# Patient Record
Sex: FEMALE
[De-identification: names, ages, dates, MRNs, and addresses within clinical notes are randomized per-mention and may not be internally consistent; named-entity substitution may affect disease eponyms.]

---

## 2020-02-07 ENCOUNTER — TRANSCRIPTION ENCOUNTER (OUTPATIENT)
Age: 65
End: 2020-02-07

## 2020-05-05 ENCOUNTER — TRANSCRIPTION ENCOUNTER (OUTPATIENT)
Age: 65
End: 2020-05-05

## 2020-07-28 PROBLEM — Z00.00 ENCOUNTER FOR PREVENTIVE HEALTH EXAMINATION: Status: ACTIVE | Noted: 2020-07-28

## 2020-07-29 ENCOUNTER — TRANSCRIPTION ENCOUNTER (OUTPATIENT)
Age: 65
End: 2020-07-29

## 2020-07-29 ENCOUNTER — APPOINTMENT (OUTPATIENT)
Dept: WOUND CARE | Facility: CLINIC | Age: 65
End: 2020-07-29
Payer: COMMERCIAL

## 2020-07-29 PROCEDURE — 99204 OFFICE O/P NEW MOD 45 MIN: CPT

## 2020-07-29 NOTE — ASSESSMENT
[FreeTextEntry1] : Wound Assessment and Plan:\par \par The patient presents with a wound to the left wrist\par No clinical sign of infection\par Recommendation:\par \par Apply lidocaine or topical anesthetic if needed to reduce pain upon washing the wound.\par Wash wound with ----0.9% saline/ Dakins 0.125% or Dove skin sensitive soap and clean water \par Apply --- medical grade honey\par Change dressing ---daily/\par \par Optimization of nutrition.\par Offloading to the wound site.\par \par -----Wound supplies ordered via DME\par Patient given contact information to DME\par \par Follow up appointment scheduled for 1 week\par \par TeleHealth Services discussed with the patient and/or family.  Discharge instructions given including download of Jennifer information regarding:\par 1)  [employer] Jennifer to obtain medical records\par 2)  [employer] Jennifer to conduct Face-to-Face TeleHealth visit\par 3)  [employer] for the Patient (patient takes a picture of their wound which is sent to the patient's chart for review)\par
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
## 2020-07-29 NOTE — PHYSICAL EXAM
[Normal Breath Sounds] : Normal breath sounds [Skin Ulcer] : ulcer [Oriented to Person] : oriented to person [Alert] : alert [Oriented to Place] : oriented to place [Oriented to Time] : oriented to time [Calm] : calm [Please See PDF for Tissue Analytics] : Please See PDF for [employer] Analytics. [JVD] : no jugular venous distention  [Abdomen Tenderness] : ~T ~M No abdominal tenderness [de-identified] : NAD [de-identified] : supple [de-identified] : AT [de-identified] : soft

## 2020-08-05 ENCOUNTER — APPOINTMENT (OUTPATIENT)
Dept: WOUND CARE | Facility: CLINIC | Age: 65
End: 2020-08-05
Payer: COMMERCIAL

## 2020-08-05 DIAGNOSIS — Z80.9 FAMILY HISTORY OF MALIGNANT NEOPLASM, UNSPECIFIED: ICD-10-CM

## 2020-08-05 DIAGNOSIS — L71.9 ROSACEA, UNSPECIFIED: ICD-10-CM

## 2020-08-05 DIAGNOSIS — T23.272A BURN OF SECOND DEGREE OF LEFT WRIST, INITIAL ENCOUNTER: ICD-10-CM

## 2020-08-05 PROCEDURE — 99213 OFFICE O/P EST LOW 20 MIN: CPT | Mod: 95

## 2020-08-06 ENCOUNTER — NON-APPOINTMENT (OUTPATIENT)
Age: 65
End: 2020-08-06

## 2020-08-06 PROBLEM — L71.9 ROSACEA: Status: ACTIVE | Noted: 2020-08-06

## 2020-08-06 PROBLEM — T23.272A: Status: ACTIVE | Noted: 2020-07-29

## 2020-08-06 PROBLEM — Z80.9 FAMILY HISTORY OF MALIGNANT NEOPLASM: Status: ACTIVE | Noted: 2020-08-06

## 2020-08-06 NOTE — ASSESSMENT
[FreeTextEntry1] : Wound Assessment and Plan:\par \par The patient presents with a wound to the left wrist\par No clinical sign of infection\par Recommendation:\par \par Apply lidocaine or topical anesthetic if needed to reduce pain upon washing the wound.\par Wash wound with ----0.9% saline/ Dakins 0.125% or Dove skin sensitive soap and clean water \par Apply --- medical grade honey\par Change dressing ---daily/\par \par Optimization of nutrition.\par Offloading to the wound site.\par \par -----Wound supplies ordered via DME\par Patient given contact information to DME\par \par Follow up appointment scheduled for 2 weeks\par \par TeleHealth Services discussed with the patient and/or family.  Discharge instructions given including download of Jennifer information regarding:\par 1)  [employer] Jennifer to obtain medical records\par 2)  [employer] [facility] Jennifer to conduct Face-to-Face TeleHealth visit\par 3)  [employer] for the Patient (patient takes a picture of their wound which is sent to the patient's chart for review)\par
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
## 2020-08-06 NOTE — PHYSICAL EXAM
[Normal Breath Sounds] : Normal breath sounds [Skin Ulcer] : ulcer [Alert] : alert [Oriented to Place] : oriented to place [Oriented to Person] : oriented to person [Oriented to Time] : oriented to time [Calm] : calm [Please See PDF for Tissue Analytics] : Please See PDF for [employer]. [JVD] : no jugular venous distention  [Abdomen Tenderness] : ~T ~M No abdominal tenderness [de-identified] : NAD [de-identified] : AT [de-identified] : supple [de-identified] : soft

## 2020-08-16 ENCOUNTER — NON-APPOINTMENT (OUTPATIENT)
Age: 65
End: 2020-08-16

## 2020-08-24 ENCOUNTER — APPOINTMENT (OUTPATIENT)
Dept: WOUND CARE | Facility: CLINIC | Age: 65
End: 2020-08-24
Payer: COMMERCIAL

## 2020-08-24 DIAGNOSIS — T14.90XA INJURY, UNSPECIFIED, INITIAL ENCOUNTER: ICD-10-CM

## 2020-08-24 PROCEDURE — 99213 OFFICE O/P EST LOW 20 MIN: CPT | Mod: 95

## 2020-08-24 NOTE — REVIEW OF SYSTEMS
[Skin Lesions] : skin lesion [Skin Wound] : skin wound [As Noted in HPI] : as noted in HPI [Negative] : Endocrine

## 2020-08-26 PROBLEM — T14.90XA CLOSED WOUND: Status: ACTIVE | Noted: 2020-08-26

## 2020-08-26 NOTE — PHYSICAL EXAM
[Normal Breath Sounds] : Normal breath sounds [Skin Ulcer] : ulcer [Alert] : alert [Oriented to Time] : oriented to time [Oriented to Person] : oriented to person [Oriented to Place] : oriented to place [Calm] : calm [Please See PDF for Tissue Analytics] : Please See PDF for [employer]. [de-identified] : NAD [de-identified] : AT [JVD] : no jugular venous distention  [Abdomen Tenderness] : ~T ~M No abdominal tenderness [de-identified] : supple [de-identified] : soft

## 2020-08-26 NOTE — CONSULT LETTER
[Consult Letter:] : I had the pleasure of evaluating your patient, [unfilled]. [Consult Closing:] : Thank you very much for allowing me to participate in the care of this patient.  If you have any questions, please do not hesitate to contact me. [Sincerely,] : Sincerely, [Please see my note below.] : Please see my note below.
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
## 2020-08-26 NOTE — ASSESSMENT
[FreeTextEntry1] : Wound Assessment and Plan:\par \par The patient presents with a wound to the left wrist now healed\par No clinical sign of infection\par f/u prn\par \par TeleHealth Services discussed with the patient and/or family.  Discharge instructions given including download of Jennifer information regarding:\par 1)  [email or web address] Jennifer to obtain medical records\par 2)  [employer] [facility] Jennifer to conduct Face-to-Face TeleHealth visit\par 3)  [employer] for the Patient (patient takes a picture of their wound which is sent to the patient's chart for review)\par \par 8/24/20\par Patient's wound appears healed

## 2021-03-01 ENCOUNTER — TRANSCRIPTION ENCOUNTER (OUTPATIENT)
Age: 66
End: 2021-03-01

## 2021-03-03 ENCOUNTER — APPOINTMENT (OUTPATIENT)
Dept: UROLOGY | Facility: CLINIC | Age: 66
End: 2021-03-03
Payer: COMMERCIAL

## 2021-03-03 VITALS
RESPIRATION RATE: 14 BRPM | HEIGHT: 62 IN | HEART RATE: 109 BPM | OXYGEN SATURATION: 91 % | TEMPERATURE: 97.6 F | DIASTOLIC BLOOD PRESSURE: 102 MMHG | WEIGHT: 170 LBS | SYSTOLIC BLOOD PRESSURE: 157 MMHG | BODY MASS INDEX: 31.28 KG/M2

## 2021-03-03 DIAGNOSIS — Z80.52 FAMILY HISTORY OF MALIGNANT NEOPLASM OF BLADDER: ICD-10-CM

## 2021-03-03 DIAGNOSIS — Z87.39 PERSONAL HISTORY OF OTHER DISEASES OF THE MUSCULOSKELETAL SYSTEM AND CONNECTIVE TISSUE: ICD-10-CM

## 2021-03-03 DIAGNOSIS — R35.0 FREQUENCY OF MICTURITION: ICD-10-CM

## 2021-03-03 DIAGNOSIS — R39.15 URGENCY OF URINATION: ICD-10-CM

## 2021-03-03 DIAGNOSIS — F17.200 NICOTINE DEPENDENCE, UNSPECIFIED, UNCOMPLICATED: ICD-10-CM

## 2021-03-03 PROCEDURE — 51798 US URINE CAPACITY MEASURE: CPT

## 2021-03-03 PROCEDURE — 99203 OFFICE O/P NEW LOW 30 MIN: CPT | Mod: 25

## 2021-03-03 PROCEDURE — 99072 ADDL SUPL MATRL&STAF TM PHE: CPT

## 2021-03-03 RX ORDER — SULFAMETHOXAZOLE AND TRIMETHOPRIM 800; 160 MG/1; MG/1
800-160 TABLET ORAL
Qty: 10 | Refills: 0 | Status: ACTIVE | COMMUNITY
Start: 2021-03-03 | End: 1900-01-01

## 2021-03-03 NOTE — PHYSICAL EXAM
[General Appearance - Well Developed] : well developed [General Appearance - Well Nourished] : well nourished [Normal Appearance] : normal appearance [Well Groomed] : well groomed [General Appearance - In No Acute Distress] : no acute distress [Edema] : no peripheral edema [Respiration, Rhythm And Depth] : normal respiratory rhythm and effort [Exaggerated Use Of Accessory Muscles For Inspiration] : no accessory muscle use [Abdomen Soft] : soft [Abdomen Tenderness] : non-tender [Abdomen Mass (___ Cm)] : no abdominal mass palpated [Abdomen Hernia] : no hernia was discovered [Costovertebral Angle Tenderness] : no ~M costovertebral angle tenderness [Normal Station and Gait] : the gait and station were normal for the patient's age [] : no rash [No Focal Deficits] : no focal deficits [Oriented To Time, Place, And Person] : oriented to person, place, and time [Affect] : the affect was normal [Mood] : the mood was normal [Not Anxious] : not anxious [Cervical Lymph Nodes Enlarged Posterior Bilaterally] : posterior cervical [Cervical Lymph Nodes Enlarged Anterior Bilaterally] : anterior cervical [Supraclavicular Lymph Nodes Enlarged Bilaterally] : supraclavicular [Urethral Meatus] : normal urethra [External Female Genitalia] : normal external genitalia [Vagina] : normal vaginal exam [FreeTextEntry1] : soft urethra, urethjra and bladder not tender, no vag  masses or disch ,pelvic muscles not tender and no stool felt vag n rectal vault

## 2021-03-03 NOTE — REVIEW OF SYSTEMS
[Genital bacterial infection] : genital bacterial infection [Date of last menstrual period ____] : date of last menstrual period: [unfilled] [Presently in menopause ___] : presently in menopause [unfilled] [Urine Infection (bladder/kidney)] : bladder/kidney infection [Pain during urination] : pain during urination [Blood in urine that you can see] : blood visible in urine [Discharge from urine canal] : discharge from urine canal [Wake up at night to urinate  How many times?  ___] : wakes up to urinate [unfilled] times during the night [Wait a long time to urinate] : waits a long time to urinate [Slow urine stream] : slow urine stream [Interrupted urine stream] : interrupted urine stream [Bladder fullness after urinating] : bladder fullness after urinating [Joint Pain] : joint pain [Joint Swelling] : joint swelling [Limb Weakness] : limb weakness [Negative] : Heme/Lymph [Recent Weight Gain (___ Lbs)] : recent [unfilled] ~Ulb weight gain [see HPI] : see HPI [FreeTextEntry6] : frequency
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
## 2021-03-03 NOTE — ASSESSMENT
[FreeTextEntry1] : patient witnh hematuria\par resolvedw\par still with frequency and urgecvny  ? uti or other patholgy\par will send urine for eval \par bactrim ds x 5 dys pending cx results\par CT and then cysto \par

## 2021-03-05 LAB
APPEARANCE: ABNORMAL
BACTERIA: ABNORMAL
BILIRUBIN URINE: NEGATIVE
BLOOD URINE: ABNORMAL
COLOR: NORMAL
GLUCOSE QUALITATIVE U: NEGATIVE
HYALINE CASTS: 0 /LPF
KETONES URINE: NEGATIVE
LEUKOCYTE ESTERASE URINE: ABNORMAL
MICROSCOPIC-UA: NORMAL
NITRITE URINE: NEGATIVE
PH URINE: 6.5
PROTEIN URINE: ABNORMAL
RED BLOOD CELLS URINE: 42 /HPF
SPECIFIC GRAVITY URINE: 1.01
SQUAMOUS EPITHELIAL CELLS: 0 /HPF
URINE CYTOLOGY: NORMAL
UROBILINOGEN URINE: NORMAL
WHITE BLOOD CELLS URINE: 137 /HPF

## 2021-03-08 LAB — BACTERIA UR CULT: ABNORMAL

## 2021-03-16 ENCOUNTER — APPOINTMENT (OUTPATIENT)
Dept: CT IMAGING | Facility: CLINIC | Age: 66
End: 2021-03-16
Payer: COMMERCIAL

## 2021-03-16 ENCOUNTER — OUTPATIENT (OUTPATIENT)
Dept: OUTPATIENT SERVICES | Facility: HOSPITAL | Age: 66
LOS: 1 days | End: 2021-03-16
Payer: COMMERCIAL

## 2021-03-16 DIAGNOSIS — Z00.8 ENCOUNTER FOR OTHER GENERAL EXAMINATION: ICD-10-CM

## 2021-03-16 PROCEDURE — 74178 CT ABD&PLV WO CNTR FLWD CNTR: CPT

## 2021-03-16 PROCEDURE — 82565 ASSAY OF CREATININE: CPT

## 2021-03-16 PROCEDURE — 74178 CT ABD&PLV WO CNTR FLWD CNTR: CPT | Mod: 26

## 2021-03-22 ENCOUNTER — APPOINTMENT (OUTPATIENT)
Dept: UROLOGY | Facility: CLINIC | Age: 66
End: 2021-03-22
Payer: COMMERCIAL

## 2021-03-22 VITALS — TEMPERATURE: 98.2 F | SYSTOLIC BLOOD PRESSURE: 140 MMHG | DIASTOLIC BLOOD PRESSURE: 84 MMHG

## 2021-03-22 DIAGNOSIS — N32.89 OTHER SPECIFIED DISORDERS OF BLADDER: ICD-10-CM

## 2021-03-22 DIAGNOSIS — C67.9 MALIGNANT NEOPLASM OF BLADDER, UNSPECIFIED: ICD-10-CM

## 2021-03-22 DIAGNOSIS — Z87.448 PERSONAL HISTORY OF OTHER DISEASES OF URINARY SYSTEM: ICD-10-CM

## 2021-03-22 PROCEDURE — 99072 ADDL SUPL MATRL&STAF TM PHE: CPT

## 2021-03-22 PROCEDURE — 52000 CYSTOURETHROSCOPY: CPT

## 2021-03-24 ENCOUNTER — NON-APPOINTMENT (OUTPATIENT)
Age: 66
End: 2021-03-24

## 2022-04-13 ENCOUNTER — APPOINTMENT (OUTPATIENT)
Dept: ORTHOPEDIC SURGERY | Facility: CLINIC | Age: 67
End: 2022-04-13
Payer: MEDICARE

## 2022-04-13 VITALS — HEIGHT: 62 IN | BODY MASS INDEX: 23.92 KG/M2 | WEIGHT: 130 LBS

## 2022-04-13 DIAGNOSIS — Z78.9 OTHER SPECIFIED HEALTH STATUS: ICD-10-CM

## 2022-04-13 DIAGNOSIS — C67.9 MALIGNANT NEOPLASM OF BLADDER, UNSPECIFIED: ICD-10-CM

## 2022-04-13 DIAGNOSIS — S46.011D STRAIN OF MUSCLE(S) AND TENDON(S) OF THE ROTATOR CUFF OF RIGHT SHOULDER, SUBSEQUENT ENCOUNTER: ICD-10-CM

## 2022-04-13 DIAGNOSIS — M65.20 CALCIFIC TENDINITIS, UNSPECIFIED SITE: ICD-10-CM

## 2022-04-13 DIAGNOSIS — C80.1 MALIGNANT (PRIMARY) NEOPLASM, UNSPECIFIED: ICD-10-CM

## 2022-04-13 PROBLEM — Z00.00 ENCOUNTER FOR PREVENTIVE HEALTH EXAMINATION: Status: ACTIVE | Noted: 2022-04-13

## 2022-04-13 PROCEDURE — 99215 OFFICE O/P EST HI 40 MIN: CPT

## 2022-08-12 ENCOUNTER — APPOINTMENT (OUTPATIENT)
Dept: ORTHOPEDIC SURGERY | Facility: CLINIC | Age: 67
End: 2022-08-12

## 2022-08-12 VITALS — WEIGHT: 132 LBS | HEIGHT: 62 IN | BODY MASS INDEX: 24.29 KG/M2

## 2022-08-12 DIAGNOSIS — Z87.09 PERSONAL HISTORY OF OTHER DISEASES OF THE RESPIRATORY SYSTEM: ICD-10-CM

## 2022-08-12 DIAGNOSIS — Z85.51 PERSONAL HISTORY OF MALIGNANT NEOPLASM OF BLADDER: ICD-10-CM

## 2022-08-12 DIAGNOSIS — Z87.898 PERSONAL HISTORY OF OTHER SPECIFIED CONDITIONS: ICD-10-CM

## 2022-08-12 PROCEDURE — 99203 OFFICE O/P NEW LOW 30 MIN: CPT

## 2022-08-12 NOTE — HISTORY OF PRESENT ILLNESS
[Right Leg] : right leg [Gradual] : gradual [6] : 6 [9] : 9 [Burning] : burning [Dull/Aching] : dull/aching [Localized] : localized [Radiating] : radiating [Constant] : constant [Household chores] : household chores [Leisure] : leisure [Work] : work [Social interactions] : social interactions [Nothing helps with pain getting better] : Nothing helps with pain getting better [Sitting] : sitting [Standing] : standing [Walking] : walking [Exercising] : exercising [Stairs] : stairs [Lying in bed] : lying in bed [Physical therapy] : physical therapy [Retired] : Work status: retired [de-identified] : 08/12/2022 Ms. YEISON GORDON,  67 year old  female , presents today for right lower leg. Reports that she has been experiencing pain, stiffness over the anterior right lower leg. Doppler RLE which was negative for DVT. Outside x-ray 08.07.22 report of the tib/fib without acute fracture or osseous lesion.  \par \par Hx bladder cancer.  [] : no [FreeTextEntry7] : leg  [de-identified] : doppler, xray not available

## 2022-08-12 NOTE — REASON FOR VISIT
[FreeTextEntry2] : 08/12/2022 :YEISON GORDON , a 67 year year old female, presents today for right lower leg pain, no trauma, onset 8/20/22\par \par

## 2022-08-12 NOTE — DISCUSSION/SUMMARY
[de-identified] : 67f with right lower leg pain. Outside x-ray negative for fracture or osseous lesion. Hx of bladder cancer\par 1) mri right lower leg r/p metastatic disease. \par 2) rtc after MRI\par \par Entered by Shannan Buchanan acting as scribe.\par

## 2022-08-12 NOTE — PHYSICAL EXAM
[Right] : right tibia/fibula [Outside films reviewed] : Outside films reviewed [There are no fractures, subluxations or dislocations. No significant abnormalities are seen] : There are no fractures, subluxations or dislocations. No significant abnormalities are seen [] : no calf tenderness

## 2022-08-13 ENCOUNTER — APPOINTMENT (OUTPATIENT)
Dept: MRI IMAGING | Facility: CLINIC | Age: 67
End: 2022-08-13

## 2022-08-18 ENCOUNTER — FORM ENCOUNTER (OUTPATIENT)
Age: 67
End: 2022-08-18

## 2022-08-19 ENCOUNTER — APPOINTMENT (OUTPATIENT)
Dept: MRI IMAGING | Facility: CLINIC | Age: 67
End: 2022-08-19

## 2022-08-19 PROCEDURE — 73718 MRI LOWER EXTREMITY W/O DYE: CPT | Mod: RT,MH

## 2022-08-23 ENCOUNTER — APPOINTMENT (OUTPATIENT)
Dept: ORTHOPEDIC SURGERY | Facility: CLINIC | Age: 67
End: 2022-08-23

## 2022-08-23 VITALS — BODY MASS INDEX: 24.29 KG/M2 | WEIGHT: 132 LBS | HEIGHT: 62 IN

## 2022-08-23 PROCEDURE — 99214 OFFICE O/P EST MOD 30 MIN: CPT

## 2022-08-23 NOTE — DISCUSSION/SUMMARY
[de-identified] : 67f with hx of bladder cx and MRI of the right tib/fib with bone lesion. I spoke with pt's oncologist Dr. Fritz Cornejo to make him aware, pt will be following up with him in the near future. \par \par Entered by Shannan Buchanan acting as scribe.\par

## 2022-08-23 NOTE — DATA REVIEWED
[MRI] : MRI [Right] : of the right [Report was reviewed and noted in the chart] : The report was reviewed and noted in the chart [I independently reviewed and interpreted images and report] : I independently reviewed and interpreted images and report [I reviewed the films/CD] : I reviewed the films/CD [FreeTextEntry1] : Irregular appearing bone lesion suspicion for neoplasm with probable metastatic foci or potentially myeloma in the proximal tibial shaft with a 5 cm bone lesion surrounded by marrow edema and soft tissue swelling with soft tissue strains and muscle strains without displacement or malalignment. Recommend whole body bone scan to evaluate for additional foci of abnormal marrow replacement. Close clinical correlation and follow up is needed. Discussed with Dr. Luis.

## 2022-08-23 NOTE — HISTORY OF PRESENT ILLNESS
[5] : 5 [8] : 8 [de-identified] : 08/23/22: Here to f/up and review MRI results. \par 08/12/2022 Ms. YEISON GORDON,  67 year old  female , presents today for right lower leg. Reports that she has been experiencing pain, stiffness over the anterior right lower leg. Doppler RLE which was negative for DVT. Outside x-ray 08.07.22 report of the tib/fib without acute fracture or osseous lesion.  \par \par Hx bladder cancer.  [FreeTextEntry1] : rt lower leg  [FreeTextEntry5] :  YEISON GORDON is a 67 year female who is here today for rt lower leg MRI results. Pain has improved a little  [de-identified] : MRI

## 2022-09-08 ENCOUNTER — APPOINTMENT (OUTPATIENT)
Dept: ORTHOPEDIC SURGERY | Facility: CLINIC | Age: 67
End: 2022-09-08

## 2022-09-08 VITALS — BODY MASS INDEX: 24.29 KG/M2 | WEIGHT: 132 LBS | HEIGHT: 62 IN

## 2022-09-08 VITALS — HEIGHT: 62 IN | WEIGHT: 132 LBS | BODY MASS INDEX: 24.29 KG/M2

## 2022-09-08 PROCEDURE — 99213 OFFICE O/P EST LOW 20 MIN: CPT

## 2022-09-08 PROCEDURE — E0114: CPT | Mod: NU

## 2022-09-08 NOTE — HISTORY OF PRESENT ILLNESS
[8] : 8 [Radiating] : radiating [] : yes [Constant] : constant [de-identified] : 9/8/22: Here to f/up right lower leg. Reports increasing pain over the right lower leg, pain present with walking. Has followed up with her oncologist. \par 08/23/22: Here to f/up and review MRI results. \par 08/12/2022 Ms. YEISON GORDON,  67 year old  female , presents today for right lower leg. Reports that she has been experiencing pain, stiffness over the anterior right lower leg. Doppler RLE which was negative for DVT. Outside x-ray 08.07.22 report of the tib/fib without acute fracture or osseous lesion.  \par \par Hx bladder cancer.  [FreeTextEntry1] : rt lower leg  [FreeTextEntry5] :  YEISON GORDON is a 67 year female who is here today for rt lower leg MRI results.\par 9/8/2022: Pt states pain has gotten worse [FreeTextEntry7] : R thigh, knee to the foot and toes [de-identified] : MRI

## 2022-09-08 NOTE — REVIEW OF SYSTEMS
[Joint Pain] : joint pain [Negative] : Heme/Lymph [Joint Swelling] : joint swelling [Joint Stiffness] : no joint stiffness

## 2022-09-08 NOTE — DISCUSSION/SUMMARY
[de-identified] : 67f with hx of bladder cx and MRI of the right tib/fib with bone lesion. Has been following with her oncologist. Pt c/o of increased WB pain. Strongly encourage nwb, crutches provided today.  Recommended consult with orthopedic oncologist, pt provided with contact info for Dr. Davin Pettit. \par \par \par Entered by Shannan Buchanan acting as scribe.\par

## 2022-09-28 ENCOUNTER — APPOINTMENT (OUTPATIENT)
Dept: ORTHOPEDIC SURGERY | Facility: CLINIC | Age: 67
End: 2022-09-28

## 2022-09-28 VITALS — HEIGHT: 62 IN | BODY MASS INDEX: 24.29 KG/M2 | WEIGHT: 132 LBS

## 2022-09-28 DIAGNOSIS — M79.604 PAIN IN RIGHT LEG: ICD-10-CM

## 2022-09-28 PROCEDURE — 99213 OFFICE O/P EST LOW 20 MIN: CPT

## 2022-09-28 PROCEDURE — 73590 X-RAY EXAM OF LOWER LEG: CPT | Mod: RT

## 2022-09-28 NOTE — HISTORY OF PRESENT ILLNESS
[8] : 8 [Radiating] : radiating [] : yes [Constant] : constant [de-identified] : 9/28/22: Here for f/u of right lower leg. Recently had radiation and a biopsy of the bone lesion. Pain has increased. Was given dexamethasone w/ no relief.  Pain is constant. NWB in wheel chair. \par 9/8/22: Here to f/up right lower leg. Reports increasing pain over the right lower leg, pain present with walking. Has followed up with her oncologist. \par 08/23/22: Here to f/up and review MRI results. \par 08/12/2022 Ms. YEISON GORDON,  67 year old  female , presents today for right lower leg. Reports that she has been experiencing pain, stiffness over the anterior right lower leg. Doppler RLE which was negative for DVT. Outside x-ray 08.07.22 report of the tib/fib without acute fracture or osseous lesion.  \par \par Hx bladder cancer.  [FreeTextEntry1] : rt lower leg  [FreeTextEntry5] :  YEISON GORDON is a 67 year female who is here today for rt lower leg MRI results.\par 9/8/2022: Pt states pain has gotten worse [FreeTextEntry7] : R thigh, knee to the foot and toes [de-identified] : MRI

## 2022-09-28 NOTE — IMAGING
[Right] : right tibia/fibula [de-identified] : no evidence of new fracture, lesion is seen, evidence of biopsy.

## 2022-09-28 NOTE — DISCUSSION/SUMMARY
[de-identified] : 67f with hx of bladder cx and MRI of the right tib/fib with bone lesion. Has been following with her oncologist. Pt c/o of increased pain, s/p radiation and biopsy with increased pain.  No new fracture seen on x-ray. \par Advised to continue with NWB. \par Has upcoming consult with pain management.\par Recommended consult with orthopedic oncologist, pt provided with contact info for Dr. Davin Pettit. \par \par \par Entered by Shannan Buchanan acting as scribe.\par